# Patient Record
Sex: FEMALE | Race: WHITE | Employment: STUDENT | URBAN - METROPOLITAN AREA
[De-identification: names, ages, dates, MRNs, and addresses within clinical notes are randomized per-mention and may not be internally consistent; named-entity substitution may affect disease eponyms.]

---

## 2020-09-14 ENCOUNTER — HOSPITAL ENCOUNTER (EMERGENCY)
Facility: CLINIC | Age: 25
Discharge: HOME | End: 2020-09-14
Attending: EMERGENCY MEDICINE | Admitting: EMERGENCY MEDICINE
Payer: COMMERCIAL

## 2020-09-14 ENCOUNTER — APPOINTMENT (OUTPATIENT)
Dept: CT IMAGING | Facility: CLINIC | Age: 25
End: 2020-09-14
Attending: EMERGENCY MEDICINE
Payer: COMMERCIAL

## 2020-09-14 VITALS
HEART RATE: 81 BPM | BODY MASS INDEX: 29.62 KG/M2 | RESPIRATION RATE: 16 BRPM | DIASTOLIC BLOOD PRESSURE: 86 MMHG | WEIGHT: 160.94 LBS | HEIGHT: 62 IN | OXYGEN SATURATION: 97 % | TEMPERATURE: 99.5 F | SYSTOLIC BLOOD PRESSURE: 126 MMHG

## 2020-09-14 DIAGNOSIS — G40.89 OTHER SEIZURES (H): ICD-10-CM

## 2020-09-14 LAB
ANION GAP SERPL CALCULATED.3IONS-SCNC: 5 MMOL/L (ref 3–14)
BASOPHILS # BLD AUTO: 0 10E9/L (ref 0–0.2)
BASOPHILS NFR BLD AUTO: 0.5 %
BUN SERPL-MCNC: 6 MG/DL (ref 7–30)
CALCIUM SERPL-MCNC: 8.9 MG/DL (ref 8.5–10.1)
CHLORIDE SERPL-SCNC: 106 MMOL/L (ref 94–109)
CO2 SERPL-SCNC: 24 MMOL/L (ref 20–32)
CREAT SERPL-MCNC: 0.59 MG/DL (ref 0.52–1.04)
DIFFERENTIAL METHOD BLD: NORMAL
EOSINOPHIL # BLD AUTO: 0.2 10E9/L (ref 0–0.7)
EOSINOPHIL NFR BLD AUTO: 2.6 %
ERYTHROCYTE [DISTWIDTH] IN BLOOD BY AUTOMATED COUNT: 12.4 % (ref 10–15)
GFR SERPL CREATININE-BSD FRML MDRD: >90 ML/MIN/{1.73_M2}
GLUCOSE SERPL-MCNC: 108 MG/DL (ref 70–99)
HCG SERPL QL: NEGATIVE
HCT VFR BLD AUTO: 40.2 % (ref 35–47)
HGB BLD-MCNC: 12.7 G/DL (ref 11.7–15.7)
IMM GRANULOCYTES # BLD: 0 10E9/L (ref 0–0.4)
IMM GRANULOCYTES NFR BLD: 0.1 %
LYMPHOCYTES # BLD AUTO: 3.1 10E9/L (ref 0.8–5.3)
LYMPHOCYTES NFR BLD AUTO: 36 %
MCH RBC QN AUTO: 28.6 PG (ref 26.5–33)
MCHC RBC AUTO-ENTMCNC: 31.6 G/DL (ref 31.5–36.5)
MCV RBC AUTO: 91 FL (ref 78–100)
MONOCYTES # BLD AUTO: 0.6 10E9/L (ref 0–1.3)
MONOCYTES NFR BLD AUTO: 6.8 %
NEUTROPHILS # BLD AUTO: 4.6 10E9/L (ref 1.6–8.3)
NEUTROPHILS NFR BLD AUTO: 54 %
NRBC # BLD AUTO: 0 10*3/UL
NRBC BLD AUTO-RTO: 0 /100
PLATELET # BLD AUTO: 422 10E9/L (ref 150–450)
POTASSIUM SERPL-SCNC: 3.9 MMOL/L (ref 3.4–5.3)
RBC # BLD AUTO: 4.44 10E12/L (ref 3.8–5.2)
SODIUM SERPL-SCNC: 135 MMOL/L (ref 133–144)
WBC # BLD AUTO: 8.5 10E9/L (ref 4–11)

## 2020-09-14 PROCEDURE — 80048 BASIC METABOLIC PNL TOTAL CA: CPT | Performed by: EMERGENCY MEDICINE

## 2020-09-14 PROCEDURE — 70450 CT HEAD/BRAIN W/O DYE: CPT

## 2020-09-14 PROCEDURE — 99285 EMERGENCY DEPT VISIT HI MDM: CPT | Mod: 25

## 2020-09-14 PROCEDURE — 84703 CHORIONIC GONADOTROPIN ASSAY: CPT | Performed by: EMERGENCY MEDICINE

## 2020-09-14 PROCEDURE — 85025 COMPLETE CBC W/AUTO DIFF WBC: CPT | Performed by: EMERGENCY MEDICINE

## 2020-09-14 ASSESSMENT — MIFFLIN-ST. JEOR: SCORE: 1438.25

## 2020-09-14 ASSESSMENT — ENCOUNTER SYMPTOMS: SPEECH DIFFICULTY: 1

## 2020-09-14 NOTE — ED AVS SNAPSHOT
[facility] Emergency Department  [address]  [facility] [address]  Phone:  [phone]  Fax:  [phone]                                    Mirna Carmona   MRN: [id number]    Department:  [facility] Emergency Department   Date of Visit:  9/14/2020           After Visit Summary Signature Page    I have received my discharge instructions, and my questions have been answered. I have discussed any challenges I see with this plan with the nurse or doctor.    ..........................................................................................................................................  Patient/Patient Representative Signature      ..........................................................................................................................................  Patient Representative Print Name and Relationship to Patient    ..................................................               ................................................  Date                                   Time    ..........................................................................................................................................  Reviewed by Signature/Title    ...................................................              ..............................................  Date                                               Time          22EPIC Rev 08/18

## 2020-09-15 NOTE — DISCHARGE INSTRUCTIONS
Please return to the emergency room if there is full body seizures that do not resolve with time and there is loss of time tongue biting or urinary incontinence associated with them.  Twitches of the arm do need further work-up but this can happen through the neurologist.  We have faxed a referral to [facility] please take the video of your daughter's activity and bring that to the neurologist for further assessment.

## 2020-09-15 NOTE — ED TRIAGE NOTES
Pt reports having episodes of jerking of head and neck and arms that are accompanied by weaknss and difficulty speaking.  These started in august and have been getting more frequent and intense.  Denies and LOC or headache. Has not been able to get an appointment to see a doctor about this issue.    Pt also states that she had a breast reduction august 12.

## 2020-09-15 NOTE — ED PROVIDER NOTES
"  History     Chief Complaint:  Muscle spasms    HPI   Mirna Carmona is a 25 year old female with a history of asthma and fibromyalgia who presents to the emergency department for evaluation of muscle spasms. The patient reports that has had episodes of muscle spasms for the past month that have been increasing in frequency. She states that these spasms generally involve her head/neck, arms and once involved her legs. The patient is conscious during these episodes that last about 15 minutes at a time before stopping on their own. These spasms were rather infrequent, but she has had 6 episodes in the last 2 days. The patient does not report any known triggers for her episodes. The mother notes that the patient seems to have slurred and sluggish speech after these episodes. The patient otherwise denies any loss of consciousness. Of note, the patient recently underwent surgery for a breast reduction on 08/12/2020.    Allergies:  No Known Drug Allergies    Medications:    Cymbalta  Apri  Keflex  Roxicodone  Zofran    Past Medical History:    Asthma  Breast disorder  Chronic back pain  Irregular periods  Fibromyalgia  Poor sleep pattern    Past Surgical History:    Impacted teeth    Family History:    No past pertinent family history.    Social History:  The patient was accompanied to the ED by her mother.  Smoking Status: Never  Smokeless Tobacco: Never  Alcohol Use: Yes  Drug Use: Not on file       Review of Systems   Neurological: Positive for speech difficulty. Negative for syncope.   All other systems reviewed and are negative.        Physical Exam   Vitals:  Patient Vitals for the past 24 hrs:   BP Temp Temp src Pulse Resp SpO2 Height Weight   09/14/20 2200 126/86 -- -- 81 -- 97 % -- --   09/14/20 2100 109/81 -- -- 87 -- 97 % -- --   09/14/20 2006 (!) 141/94 99.5  F (37.5  C) Temporal 108 16 98 % 1.575 m (5' 2\") 73 kg (160 lb 15 oz)       Physical Exam  Vitals signs reviewed.   Constitutional:       Appearance: " She is obese.   HENT:      Head: Normocephalic and atraumatic.   Cardiovascular:      Rate and Rhythm: Normal rate and regular rhythm.   Pulmonary:      Effort: Pulmonary effort is normal.      Breath sounds: Normal breath sounds.   Abdominal:      General: Abdomen is flat. Bowel sounds are normal.      Palpations: Abdomen is soft.   Skin:     Capillary Refill: Capillary refill takes less than 2 seconds.   Neurological:      General: No focal deficit present.      Mental Status: She is alert and oriented to person, place, and time.   Psychiatric:         Mood and Affect: Mood normal.         Emergency Department Course     Imaging:  Radiographic findings were communicated with the patient who voiced understanding of the findings.    CT Head WO Contrast  1.  No acute intracranial process.  Reading per radiology.    Laboratory:  CBC: WNL. (WBC 8.5, HGB 12.7, )   BMP: Glucose 108 (H), Urea Nitrogen 6 (L) o/w AWNL (Creatinine 0.59)    HCG Qualitative Blood: Negative     Emergency Department Course:  Past medical records, nursing notes, and vitals reviewed.    2017 I performed an exam of the patient as documented above.     IV was inserted and blood was drawn for laboratory testing, results above.  The patient was sent for a head CT while in the emergency department, results above.     2215 I rechecked the patient and discussed the results of her workup thus far.     Findings and plan explained to the Patient and mother. Patient discharged home with instructions regarding supportive care, medications, and reasons to return. The importance of close follow-up was reviewed.     I personally reviewed the laboratory results with the Patient and mother and answered all related questions prior to discharge.      Impression & Plan      Medical Decision Making:  Patient presents with episodes of twitching.  Mom arrives with the patient who shows a video of her spasming in her upper arm but patient never loses consciousness  bites her tongue hurts herself or has urinary incontinence.  The video of her behavior seems like a pseudoseizure but cannot rule out partial complex seizures.  Evaluation for seizure was undertaken including imaging of the brain and lab work which is unremarkable.  Care was discussed with the family I recommend she do make an appointment with a neurologist for assessment of this behavior.  I think it is up to them to decide if this is partial seizures or more likely pseudoseizures.  We will refer as an outpatient using a fax referral to [facility].  No need for admission was identified as there is no concerns for tonic-clonic seizures due to lack of loss of consciousness lack of time and lack of history of self injury.    Diagnosis:    ICD-10-CM    1. Other seizures (H)  G40.89        Disposition:  discharged to home    Discharge Medications:  None      I, Albaro Vazquez, am serving as a scribe on 9/14/2020 at 8:24 PM to personally document services performed by Cortez Romero MD based on my observations and the provider's statements to me.    Albaro Vazquez  9/14/2020   [facility] EMERGENCY DEPARTMENT       Cortez Romero MD  09/21/20 0737

## 2025-08-27 ENCOUNTER — TRANSCRIBE ORDERS (OUTPATIENT)
Dept: OTHER | Age: 30
End: 2025-08-27

## 2025-08-27 ENCOUNTER — MEDICAL CORRESPONDENCE (OUTPATIENT)
Dept: HEALTH INFORMATION MANAGEMENT | Facility: CLINIC | Age: 30
End: 2025-08-27

## 2025-08-27 DIAGNOSIS — R00.2 PALPITATIONS: ICD-10-CM

## 2025-08-27 DIAGNOSIS — R42 DIZZINESS: Primary | ICD-10-CM
